# Patient Record
Sex: MALE | Race: WHITE | Employment: FULL TIME | URBAN - METROPOLITAN AREA
[De-identification: names, ages, dates, MRNs, and addresses within clinical notes are randomized per-mention and may not be internally consistent; named-entity substitution may affect disease eponyms.]

---

## 2017-03-23 ENCOUNTER — TELEPHONE (OUTPATIENT)
Dept: CARDIOLOGY | Facility: CLINIC | Age: 52
End: 2017-03-23

## 2017-03-24 ENCOUNTER — OFFICE VISIT (OUTPATIENT)
Dept: CARDIOLOGY | Facility: CLINIC | Age: 52
End: 2017-03-24
Attending: INTERNAL MEDICINE
Payer: COMMERCIAL

## 2017-03-24 VITALS
BODY MASS INDEX: 30.92 KG/M2 | WEIGHT: 216 LBS | SYSTOLIC BLOOD PRESSURE: 124 MMHG | HEIGHT: 70 IN | HEART RATE: 65 BPM | DIASTOLIC BLOOD PRESSURE: 92 MMHG | OXYGEN SATURATION: 98 %

## 2017-03-24 DIAGNOSIS — I10 ESSENTIAL HYPERTENSION: ICD-10-CM

## 2017-03-24 DIAGNOSIS — I25.10 CORONARY ARTERY DISEASE INVOLVING NATIVE CORONARY ARTERY OF NATIVE HEART WITHOUT ANGINA PECTORIS: ICD-10-CM

## 2017-03-24 PROCEDURE — 99214 OFFICE O/P EST MOD 30 MIN: CPT | Performed by: INTERNAL MEDICINE

## 2017-03-24 RX ORDER — ATORVASTATIN CALCIUM 10 MG/1
10 TABLET, FILM COATED ORAL DAILY
Qty: 30 TABLET | Refills: 11 | Status: SHIPPED | OUTPATIENT
Start: 2017-03-24 | End: 2018-01-15

## 2017-03-24 RX ORDER — ASPIRIN 325 MG
325 TABLET ORAL DAILY
COMMUNITY
Start: 2017-03-24 | End: 2019-01-16

## 2017-03-24 NOTE — LETTER
3/24/2017    Kaylee Villavicencio  [facility]   [address]  [place] [address]    RE: Steve Griggsin       Dear Colleague,    I had the pleasure of seeing Steve Corcoran in the [facility].    Steve Corcoran, a 51-year-old man with coronary artery disease, distant history of pulmonary embolus, factor V Leiden deficiency, hypertension, and dyslipidemia was seen today for followup of coronary disease.      The patient initially presented to our system in 02/2016 with prolonged left-sided chest tightness radiating to his back, accompanied by dyspnea.  A CT pulmonary angiogram showed no evidence of pulmonary embolus, but troponin levels were mildly positive.  Diagnostic left heart catheterization, left ventriculogram and coronary angiography was performed.  The ejection fraction was 55% with no regional wall motion abnormalities.  The coronaries had generalized mild atheromatous change, but no significant focal narrowing and VIELKA 3 flow in all vessels.      We recommended standard medical therapy including aspirin, clopidogrel, high-potency statin agent, metoprolol and lisinopril.      Since I last saw the patient, he has discontinued all of his medications on his own and without seeking our .     The patient has felt well and denies chest, arm, neck, jaw or back discomfort with exertion.  He continues to smoke cigarettes.      PHYSICAL EXAMINATION:   GENERAL:  Exam today demonstrates a very friendly, cooperative 51-year-old man.   VITAL SIGNS:  His blood pressure is 124/90.  His heart rate is 65.  His height is 1.8 meters, his weight is 98 kg.  His BMI is 31.   LUNGS:  Clear to percussion and auscultation.   CARDIOVASCULAR:  Shows a normal S1 with a normal S2, there is no S3.  There is no murmur, rub or click.     Outpatient Encounter Prescriptions as of 3/24/2017   Medication Sig Dispense Refill     atorvastatin (LIPITOR) 10 MG tablet Take 1 tablet (10 mg) by mouth  daily 30 tablet 11     aspirin 325 MG tablet Take 1 tablet (325 mg) by mouth daily       [DISCONTINUED] clopidogrel (PLAVIX) 75 MG tablet Take 1 tablet (75 mg) by mouth daily 30 tablet 12     [DISCONTINUED] oxyCODONE-acetaminophen (PERCOCET) 5-325 MG per tablet Take 1-2 tablets by mouth every 4 hours as needed for moderate to severe pain 30 tablet 0     [DISCONTINUED] lisinopril (PRINIVIL,ZESTRIL) 10 MG tablet Take 1 tablet (10 mg) by mouth daily 90 tablet 3     [DISCONTINUED] atorvastatin (LIPITOR) 40 MG tablet Take 1 tablet (40 mg) by mouth daily 90 tablet 3     [DISCONTINUED] aspirin 81 MG EC tablet Take 1 tablet (81 mg) by mouth daily  0     No facility-administered encounter medications on file as of 3/24/2017.       ASSESSMENT:  Mr. Corcoran is asymptomatic.  I have reviewed the importance of cessation of all tobacco use and compliance with aspirin and statin therapy for the reduction of future adverse cardiac events.      At this point, I have nothing to add to his current medical therapy.  The patient told me he has not seen his primary care doctors for quite a while but plans to resume followup with Dr. Villavicencio.      RECOMMENDATIONS:   1.  Continue aspirin, at least 81 mg daily.   2.  Continue atorvastatin 40 mg a day.  I rewrote another prescription for the patient.   3.  I have counseled the patient regarding a Mediterranean-style low-fat, low-cholesterol diet.   4.  Cessation of all tobacco use.      Followup visit hs not been scheduled at this time, but we would be happy to see him again at any point in the future should you so request.      Again, thank you for allowing me to participate in the care of your patient.      Sincerely,    Uriah Madera MD     [facility]

## 2017-03-24 NOTE — MR AVS SNAPSHOT
"              After Visit Summary   3/24/2017    Steve Corcoran    MRN: [id number]           Patient Information     Date Of Birth          1965        Visit Information        Provider Department      3/24/2017 3:45 PM Uriah Madera MD [facility]        Today's Diagnoses     Coronary artery disease involving native coronary artery of native heart without angina pectoris        Essential hypertension           Follow-ups after your visit        Who to contact     If you have questions or need follow up information about today's clinic visit or your schedule please contact [facility] directly at [phone].  Normal or non-critical lab and imaging results will be communicated to you by MyChart, letter or phone within 4 business days after the clinic has received the results. If you do not hear from us within 7 days, please contact the clinic through [employer] or phone. If you have a critical or abnormal lab result, we will notify you by phone as soon as possible.  Submit refill requests through [employer] or call your pharmacy and they will forward the refill request to us. Please allow 3 business days for your refill to be completed.          Additional Information About Your Visit        MyChart Information     [employer] lets you send messages to your doctor, view your test results, renew your prescriptions, schedule appointments and more. To sign up, go to [email or web address] . Click on \"Log in\" on the left side of the screen, which will take you to the Welcome page. Then click on \"Sign up Now\" on the right side of the page.     You will be asked to enter the access code listed below, as well as some personal information. Please follow the directions to create your username and password.     Your access code is: [email or web address]  Expires: 2017  4:26 PM     Your access code will  in 90 days. If you need help or a " "new code, please call your [facility] clinic or [phone].        Care EveryWhere ID     This is your Care EveryWhere ID. This could be used by other organizations to access your [facility] medical records  [id number]        Your Vitals Were     Pulse Height Pulse Oximetry BMI (Body Mass Index)          65 1.778 m (5' 10\") 98% 30.99 kg/m2         Blood Pressure from Last 3 Encounters:   03/24/17 (!) 124/92   05/02/16 120/80   04/21/16 105/62    Weight from Last 3 Encounters:   03/24/17 98 kg (216 lb)   05/02/16 95.7 kg (211 lb)   03/14/16 98.4 kg (217 lb)              We Performed the Following     Follow-Up with Cardiologist          Today's Medication Changes          These changes are accurate as of: 3/24/17  4:27 PM.  If you have any questions, ask your nurse or doctor.               Start taking these medicines.        Dose/Directions    aspirin 325 MG tablet   Used for:  Coronary artery disease involving native coronary artery of native heart without angina pectoris   Started by:  Uriah Madera MD        Dose:  325 mg   Take 1 tablet (325 mg) by mouth daily   Refills:  0       atorvastatin 10 MG tablet   Commonly known as:  LIPITOR   Used for:  Coronary artery disease involving native coronary artery of native heart without angina pectoris, Essential hypertension   Started by:  Uriah Madera MD        Dose:  10 mg   Take 1 tablet (10 mg) by mouth daily   Quantity:  30 tablet   Refills:  11            Where to get your medicines      These medications were sent to [id number] - [facility] [place] [address] [address]     Phone:  [phone]     atorvastatin 10 MG tablet         Some of these will need a paper prescription and others can be bought over the counter.  Ask your nurse if you have questions.     You don't need a prescription for these medications     aspirin 325 MG tablet                Primary Care Provider Office Phone # " Fax #    Kaylee Villavicencio [phone]       [facility] [address] [place] [address]        Thank you!     Thank you for choosing [facility]  for your care. Our goal is always to provide you with excellent care. Hearing back from our patients is one way we can continue to improve our services. Please take a few minutes to complete the written survey that you may receive in the mail after your visit with us. Thank you!             Your Updated Medication List - Protect others around you: Learn how to safely use, store and throw away your medicines at www.disposemymeds.org.          This list is accurate as of: 3/24/17  4:27 PM.  Always use your most recent med list.                   Brand Name Dispense Instructions for use    aspirin 325 MG tablet      Take 1 tablet (325 mg) by mouth daily       atorvastatin 10 MG tablet    LIPITOR    30 tablet    Take 1 tablet (10 mg) by mouth daily

## 2017-03-25 NOTE — PROGRESS NOTES
HISTORY OF PRESENT ILLNESS:  Steve Corcoran, a 51-year-old man with coronary artery disease, distant history of pulmonary embolus, factor V Leiden deficiency, hypertension, and dyslipidemia was seen today for followup of coronary disease.      The patient initially presented to our system in 02/2016 with prolonged left-sided chest tightness radiating to his back, accompanied by dyspnea.  A CT pulmonary angiogram showed no evidence of pulmonary embolus, but troponin levels were mildly positive.  Diagnostic left heart catheterization, left ventriculogram and coronary angiography was performed.  The ejection fraction was 55% with no regional wall motion abnormalities.  The coronaries had generalized mild atheromatous change, but no significant focal narrowing and VIELKA 3 flow in all vessels.      We recommended standard medical therapy including aspirin, clopidogrel, high-potency statin agent, metoprolol and lisinopril.      Since I last saw the patient, he has discontinued all of his medications on his own and without seeking our .     The patient has felt well and denies chest, arm, neck, jaw or back discomfort with exertion.  He continues to smoke cigarettes.      PHYSICAL EXAMINATION:   GENERAL:  Exam today demonstrates a very friendly, cooperative 51-year-old man.   VITAL SIGNS:  His blood pressure is 124/90.  His heart rate is 65.  His height is 1.8 meters, his weight is 98 kg.  His BMI is 31.   LUNGS:  Clear to percussion and auscultation.   CARDIOVASCULAR:  Shows a normal S1 with a normal S2, there is no S3.  There is no murmur, rub or click.      ASSESSMENT:  Mr. Corcoran is asymptomatic.  I have reviewed the importance of cessation of all tobacco use and compliance with aspirin and statin therapy for the reduction of future adverse cardiac events.      At this point, I have nothing to add to his current medical therapy.  The patient told me he has not seen his primary care doctors for quite a while but  plans to resume followup with Dr. Villavicencio.      RECOMMENDATIONS:   1.  Continue aspirin, at least 81 mg daily.   2.  Continue atorvastatin 40 mg a day.  I rewrote another prescription for the patient.   3.  I have counseled the patient regarding a Mediterranean-style low-fat, low-cholesterol diet.   4.  Cessation of all tobacco use.      Followup visit hs not been scheduled at this time, but we would be happy to see him again at any point in the future should you so request.      cc:   [place] [address]         TITA FARIAS MD             D: 2017 16:32   T: 2017 06:54   MT: FEDERICO      Name:     RAMESH RINCON   MRN:      [id number]        Account:      [id number]   :      1965           Service Date: 2017      Document: [id number]

## 2018-01-15 ENCOUNTER — HOSPITAL ENCOUNTER (EMERGENCY)
Facility: CLINIC | Age: 53
Discharge: HOME | End: 2018-01-15
Attending: NURSE PRACTITIONER | Admitting: NURSE PRACTITIONER
Payer: OTHER MISCELLANEOUS

## 2018-01-15 VITALS
SYSTOLIC BLOOD PRESSURE: 150 MMHG | HEART RATE: 78 BPM | OXYGEN SATURATION: 100 % | DIASTOLIC BLOOD PRESSURE: 102 MMHG | TEMPERATURE: 97.6 F | RESPIRATION RATE: 20 BRPM

## 2018-01-15 DIAGNOSIS — R03.0 ELEVATED BLOOD PRESSURE READING: ICD-10-CM

## 2018-01-15 DIAGNOSIS — S61.011A LACERATION OF RIGHT THUMB WITHOUT FOREIGN BODY WITHOUT DAMAGE TO NAIL, INITIAL ENCOUNTER: ICD-10-CM

## 2018-01-15 PROCEDURE — 12002 RPR S/N/AX/GEN/TRNK2.6-7.5CM: CPT

## 2018-01-15 PROCEDURE — 99283 EMERGENCY DEPT VISIT LOW MDM: CPT

## 2018-01-15 RX ORDER — LIDOCAINE HYDROCHLORIDE 10 MG/ML
INJECTION, SOLUTION INFILTRATION; PERINEURAL
Status: DISCONTINUED
Start: 2018-01-15 | End: 2018-01-15 | Stop reason: HOSPADM

## 2018-01-15 RX ORDER — BUPIVACAINE HYDROCHLORIDE 5 MG/ML
INJECTION, SOLUTION PERINEURAL
Status: DISCONTINUED
Start: 2018-01-15 | End: 2018-01-15 | Stop reason: HOSPADM

## 2018-01-15 RX ORDER — LIDOCAINE HYDROCHLORIDE 10 MG/ML
INJECTION, SOLUTION EPIDURAL; INFILTRATION; INTRACAUDAL; PERINEURAL
Status: DISCONTINUED
Start: 2018-01-15 | End: 2018-01-15 | Stop reason: WASHOUT

## 2018-01-15 ASSESSMENT — ENCOUNTER SYMPTOMS: WOUND: 1

## 2018-01-15 NOTE — LETTER
January 15, 2018      To Whom It May Concern:      Steve Corcoran was seen in our Emergency Department today, 01/15/18. He may return tomorrow. Wound needs to be kept clean and dry.        Sincerely,        Macrina Sahu RN

## 2018-01-15 NOTE — ED AVS SNAPSHOT
[facility] Emergency Department    [address]    [facility] [address]    Phone:  [phone]    Fax:  [phone]                                       Steve Corcoran   MRN: [id number]    Department:  [facility] Emergency Department   Date of Visit:  1/15/2018           After Visit Summary Signature Page     I have received my discharge instructions, and my questions have been answered. I have discussed any challenges I see with this plan with the nurse or doctor.    ..........................................................................................................................................  Patient/Patient Representative Signature      ..........................................................................................................................................  Patient Representative Print Name and Relationship to Patient    ..................................................               ................................................  Date                                            Time    ..........................................................................................................................................  Reviewed by Signature/Title    ...................................................              ..............................................  Date                                                            Time

## 2018-01-15 NOTE — ED AVS SNAPSHOT
[facility] Emergency Department    [address] [place] [address]    Phone:  [phone]    Fax:  [phone]                                       Steve Corcoran   MRN: [id number]    Department:  [facility] Emergency Department   Date of Visit:  1/15/2018           Patient Information     Date Of Birth          1965        Your diagnoses for this visit were:     Laceration of right thumb without foreign body without damage to nail, initial encounter     Elevated blood pressure reading        You were seen by Bart Glaser, [facility].      Follow-up Information     Follow up with Kaylee Villavicencio In 3 days.    Specialty:  Family Practice    Contact information:    [facility]  [address] [place] [address] [phone]          Discharge Instructions          Have sutures out in 10 days.    * LACERATION (All Closures)  A laceration is a cut through the skin. This will usually require stitches (sutures) or staples if it is deep. Minor cuts may be treated with a tape closure ( Steri-Strips ) or Dermabond skin glue.       HOME CARE:  PAIN MEDICINE: You may use acetaminophen (Tylenol) 650-1000 mg every 6 hours or ibuprofen (Motrin, Advil) 600 mg every 6-8 hours with food to control pain, if you are able to take these medicines. [NOTE: If you have chronic liver or kidney disease or ever had a stomach ulcer or GI bleeding, talk with your doctor before using these medicines.]  EXTREMITY, FACE or TRUNK WOUNDS:    Keep the wound clean and dry. If a bandage was applied and it becomes wet or dirty, replace it. Otherwise, leave it in place for the first 24 hours.    If stitches or staples were used, clean the wound daily. Protect the wound from sunlight and tanning lamps.    After removing the bandage, wash the area with soap and water. Use a wet cotton swab (Q tip) to loosen and remove any blood or crust that forms.    After cleaning, apply a thin  layer of Polysporin or Bacitracin ointment. This will keep the wound clean and make it easier to remove the stitches or staples. Reapply a fresh bandage.    You may remove the bandage to shower as usual after the first 24 hours, but do not soak the area in water (no swimming) until the stitches or staples are removed.    If Steri-Strips were used, keep the area clean and dry. If it becomes wet, blot it dry with a towel. It is okay to take a brief shower, but avoid scrubbing the area.    If Dermabond skin adhesive was used, do not scratch, rub or pick at the adhesive film. Do not place tape directly over the film. Do not apply liquid, ointment or creams to the wound while the film is in place. Do not clean the wound with peroxide and do not apply ointments. Avoid activities that cause heavy sweating until the film has fallen off. Protect the wound from prolonged exposure to sunlight or tanning lamps. You may shower as usual but do not soak the wound in water (no baths or swimming). The film will fall off by itself in 5-10 days.  SCALP WOUNDS: During the first two days, you may carefully rinse your hair in the shower to remove blood, glass or dirt particles. After two days, you may shower and shampoo your hair normally. Do not soak your scalp in the tub or go swimming until the stitches or staples have been removed.  MOUTH WOUNDS: Eat soft foods to reduce pain. If the cut is inside of your mouth, clean by rinsing after each meal and at bedtime with a mixture of equal parts water and Hydrogen Peroxide (do not swallow!). Or, you can use a cotton swab to directly apply Hydrogen Peroxide onto the cut.  After the wound is done healing, use sunscreen over the area whenever exposed for the next 6 minths to avoid a darker scar.     FOLLOW UP: Most skin wounds heal within ten days. Mouth and facial wounds heal within five days. However, even with proper treatment, a wound infection may sometimes occur. Therefore, you should  check the wound daily for signs of infection listed below.  Stitches should be removed from the face within five days; stitches and staples should be removed from other parts of the body within 7-10 days. Unless you are told to come back to the emergency room, you may have your doctor or urgent care remove the stitches. If dissolving stitches were used in the mouth, these will fall out or dissolve without the need for removal. If tape closures ( Steri-Strips ) were used, remove them yourself if they have not fallen off after 7 days. If Dermabond skin glue was used, the film will fall off by itself in 5-10 days.      GET PROMPT MEDICAL ATTENTION  if any of the following occur:    Increasing pain in the wound    Redness, swelling or pus coming from the wound    Fever over 101 F (38.3 C) oral    If stitches or staples come apart or fall out or if Steri-Strips fall off before seven days    If the wound edges re-open    Bleeding not controlled by direct pressure    6126-1486 The "[employer]". [address] [place] [address]. All rights reserved. This information is not intended as a substitute for professional medical care. Always follow your healthcare professional's instructions.  This information has been modified by your health care provider with permission from the publisher.      24 Hour Appointment Hotline       To make an appointment at any [facility], call [phone] ([phone]). If you don't have a family doctor or clinic, we will help you find one. [facility] clinics are conveniently located to serve the needs of you and your family.             Review of your medicines      Our records show that you are taking the medicines listed below. If these are incorrect, please call your family doctor or clinic.        Dose / Directions Last dose taken    aspirin 325 MG tablet   Dose:  325 mg        Take 1 tablet (325 mg) by mouth daily   Refills:  0        CHANTIX PO        Refills:  0         CRESTOR PO        Refills:  0                Orders Needing Specimen Collection     None      Pending Results     No orders found from 1/13/2018 to 1/16/2018.            Pending Culture Results     No orders found from 1/13/2018 to 1/16/2018.            Pending Results Instructions     If you had any lab results that were not finalized at the time of your Discharge, you can call the ED Lab Result RN at [phone]. You will be contacted by this team for any positive Lab results or changes in treatment. The nurses are available 7 days a week from 10A to 6:30P.  You can leave a message 24 hours per day and they will return your call.        Test Results From Your Hospital Stay               Clinical Quality Measure: Blood Pressure Screening     Your blood pressure was checked while you were in the emergency department today. The last reading we obtained was  BP: (!) 150/102 . Please read the guidelines below about what these numbers mean and what you should do about them.  If your systolic blood pressure (the top number) is less than 120 and your diastolic blood pressure (the bottom number) is less than 80, then your blood pressure is normal. There is nothing more that you need to do about it.  If your systolic blood pressure (the top number) is 120-139 or your diastolic blood pressure (the bottom number) is 80-89, your blood pressure may be higher than it should be. You should have your blood pressure rechecked within a year by a primary care provider.  If your systolic blood pressure (the top number) is 140 or greater or your diastolic blood pressure (the bottom number) is 90 or greater, you may have high blood pressure. High blood pressure is treatable, but if left untreated over time it can put you at risk for heart attack, stroke, or kidney failure. You should have your blood pressure rechecked by a primary care provider within the next 4 weeks.  If your provider in the emergency department today gave you  "specific instructions to follow-up with your doctor or provider even sooner than that, you should follow that instruction and not wait for up to 4 weeks for your follow-up visit.        Thank you for choosing [facility]       Thank you for choosing [facility] for your care. Our goal is always to provide you with excellent care. Hearing back from our patients is one way we can continue to improve our services. Please take a few minutes to complete the written survey that you may receive in the mail after you visit with us. Thank you!        [employer] Information     [employer] lets you send messages to your doctor, view your test results, renew your prescriptions, schedule appointments and more. To sign up, go to [email or web address] . Click on \"Log in\" on the left side of the screen, which will take you to the Welcome page. Then click on \"Sign up Now\" on the right side of the page.     You will be asked to enter the access code listed below, as well as some personal information. Please follow the directions to create your username and password.     Your access code is: [email or web address]  Expires: 4/15/2018  7:16 PM     Your access code will  in 90 days. If you need help or a new code, please call your [facility] clinic or [phone].        Care EveryWhere ID     This is your Care EveryWhere ID. This could be used by other organizations to access your [facility] medical records  [id number]        Equal Access to Services     GINA MCHUGH : Hadii almas John, christy peterson, qasonal angalbraden mars . So [facility] [phone].    ATENCIÓN: Si habla español, tiene a banuelos disposición servicios gratuitos de asistencia lingüística. Madeleine al [phone].    We comply with applicable federal civil rights laws and [place] laws. We do not discriminate on the basis of race, color, national origin, age, disability, sex, sexual orientation, or gender identity.            After " Visit Summary       This is your record. Keep this with you and show to your community pharmacist(s) and doctor(s) at your next visit.

## 2018-01-16 NOTE — ED NOTES
Right thumb laceration at work about 1700 this evening. Cut on the edge of an oil pan.   Bleeding controlled tetanus up to date.  Patient alert and oriented x3.  Airway, breathing and circulation intact.

## 2018-01-16 NOTE — DISCHARGE INSTRUCTIONS
Have sutures out in 10 days.    * LACERATION (All Closures)  A laceration is a cut through the skin. This will usually require stitches (sutures) or staples if it is deep. Minor cuts may be treated with a tape closure ( Steri-Strips ) or Dermabond skin glue.       HOME CARE:  PAIN MEDICINE: You may use acetaminophen (Tylenol) 650-1000 mg every 6 hours or ibuprofen (Motrin, Advil) 600 mg every 6-8 hours with food to control pain, if you are able to take these medicines. [NOTE: If you have chronic liver or kidney disease or ever had a stomach ulcer or GI bleeding, talk with your doctor before using these medicines.]  EXTREMITY, FACE or TRUNK WOUNDS:    Keep the wound clean and dry. If a bandage was applied and it becomes wet or dirty, replace it. Otherwise, leave it in place for the first 24 hours.    If stitches or staples were used, clean the wound daily. Protect the wound from sunlight and tanning lamps.    After removing the bandage, wash the area with soap and water. Use a wet cotton swab (Q tip) to loosen and remove any blood or crust that forms.    After cleaning, apply a thin layer of Polysporin or Bacitracin ointment. This will keep the wound clean and make it easier to remove the stitches or staples. Reapply a fresh bandage.    You may remove the bandage to shower as usual after the first 24 hours, but do not soak the area in water (no swimming) until the stitches or staples are removed.    If Steri-Strips were used, keep the area clean and dry. If it becomes wet, blot it dry with a towel. It is okay to take a brief shower, but avoid scrubbing the area.    If Dermabond skin adhesive was used, do not scratch, rub or pick at the adhesive film. Do not place tape directly over the film. Do not apply liquid, ointment or creams to the wound while the film is in place. Do not clean the wound with peroxide and do not apply ointments. Avoid activities that cause heavy sweating until the film has fallen off.  Protect the wound from prolonged exposure to sunlight or tanning lamps. You may shower as usual but do not soak the wound in water (no baths or swimming). The film will fall off by itself in 5-10 days.  SCALP WOUNDS: During the first two days, you may carefully rinse your hair in the shower to remove blood, glass or dirt particles. After two days, you may shower and shampoo your hair normally. Do not soak your scalp in the tub or go swimming until the stitches or staples have been removed.  MOUTH WOUNDS: Eat soft foods to reduce pain. If the cut is inside of your mouth, clean by rinsing after each meal and at bedtime with a mixture of equal parts water and Hydrogen Peroxide (do not swallow!). Or, you can use a cotton swab to directly apply Hydrogen Peroxide onto the cut.  After the wound is done healing, use sunscreen over the area whenever exposed for the next 6 minths to avoid a darker scar.     FOLLOW UP: Most skin wounds heal within ten days. Mouth and facial wounds heal within five days. However, even with proper treatment, a wound infection may sometimes occur. Therefore, you should check the wound daily for signs of infection listed below.  Stitches should be removed from the face within five days; stitches and staples should be removed from other parts of the body within 7-10 days. Unless you are told to come back to the emergency room, you may have your doctor or urgent care remove the stitches. If dissolving stitches were used in the mouth, these will fall out or dissolve without the need for removal. If tape closures ( Steri-Strips ) were used, remove them yourself if they have not fallen off after 7 days. If Dermabond skin glue was used, the film will fall off by itself in 5-10 days.      GET PROMPT MEDICAL ATTENTION  if any of the following occur:    Increasing pain in the wound    Redness, swelling or pus coming from the wound    Fever over 101 F (38.3 C) oral    If stitches or staples come apart or  fall out or if Steri-Strips fall off before seven days    If the wound edges re-open    Bleeding not controlled by direct pressure    3928-3845 [employer]. [address] [place] [address], [place] [address]. All rights reserved. This information is not intended as a substitute for professional medical care. Always follow your healthcare professional's instructions.  This information has been modified by your health care provider with permission from the publisher.

## 2018-01-16 NOTE — ED PROVIDER NOTES
History     Chief Complaint:  Laceration    HPI   Steve Corcoran is a left handed 52 year old male who presents to the emergency department today for evaluation of a right thumb laceration. At 1700 this evening the patient was removing the oil pan from a truck when he dropped it and cut his right thumb. The patient is up to date on tetanus and immunizations    Allergies:  No Known Drug Allergies    Medications:    Lipitor  Chantix   Aspirin    Past Medical History:    CAD (coronary artery disease)   Coagulation disorder    Dyslipidemia   Hypertension   NSTEMI (non-ST elevated myocardial infarction) (  Pulmonary embolus    Thrombosis of leg   Tobacco abuse     Past Surgical History:    Arthroscopy - knee  Coronary angiography adult order  Surgery - Right and left foot  Surgery - Thumb reattachment    Family History:    History reviewed. No pertinent family history.     Social History:  The patient was accompanied to the ED by himself.  Smoking Status: Current Everyday Smoker   Alcohol Use: Positive  Marital Status:       Review of Systems   Skin: Positive for wound (right thumb laceration).   All other systems reviewed and are negative.    Physical Exam     Patient Vitals for the past 24 hrs:   BP Temp Temp src Pulse Resp SpO2   01/15/18 1813 (!) 150/102 97.6  F (36.4  C) Oral 78 20 100 %     Physical Exam  Eyes: Pupils equally round  HENT: Head is normal in appearance. Oropharynx is normal with moist mucus membranes.  Cardiovascular: Normal color of mucus membranes  Respiratory: Normal respiratory effort  Musculoskeletal: No asymmetry  Skin: Normal, without rash. Right thumb laceration. No exposed tendon. Normal thumb function.  Normal flexure extension. Normal sensation. Size 3 cm laceration.  Lymphatic: No edema  Neurologic: Cranial nerves grossly intact, normal cognition, no apparent deficits.  Psychiatric: Normal affect.  Emergency Department Course     Procedures:   Laceration Repair Procedure Note:     Performed by: Bart Glaser APRN C*  Authorized by: Bart Glaser APRN C*  Consent given by: Patient who states understanding of the procedure being performed after discussing the risks, benefits and alternatives.    Preparation: Patient was prepped and draped in usual sterile fashion.  Irrigation solution: saline    Body area: Right Thumb  Laceration length: 3 cm  Contamination: The wound is contaminated.  Foreign bodies: none  Tendon involvement: none  Anesthesia: Local  Local anesthetic: Lidocaine 2%, with no epinephrine  Anesthetic total: 5ml    Debridement: none  Skin closure: Closed with 2 x 4.0 Ethilon  Technique: interrupted  Approximation: close  Approximation difficulty: simple    Sutures out in 10 days    Patient tolerance: Patient tolerated the procedure well with no immediate complications.    Interventions:  1828 1% lidocaine 5 mL Injection    Emergency Department Course:    1815 Nursing notes and vitals reviewed.    1820 I performed an exam of the patient as documented above.     1828 Wound numbed with Lidocaine 1%.    1830 Wound cleaned.    1840 Wound sutured.     1900 I personally reviewed the results with the patient and answered all related questions prior to discharge.    Impression & Plan      Medical Decision Making:  The patient presented with a laceration. The wound was carefully evaluated and explored. The laceration was closed with sutures as noted above. There is no evidence of muscular, tendon, or bony damage with this laceration. No exposed tendon, no evidence of tendon impairment, has normal thumb function and sensation. No signs of foreign body. Possible complications (infection, scarring) were reviewed with the patient. Follow up with primary care will be indicated for suture removal as noted in the discharge section.    Patient states he is aware of his elevated blood pressure he is currently working closely with his doctor and frequent and regular check up. Patient  understands the complications of untreated HTN like MI, stroke, organ damage etc.      Diagnosis:    ICD-10-CM    1. Laceration of right thumb without foreign body without damage to nail, initial encounter S61.011A    2. Elevated blood pressure reading R03.0      Disposition:   Discharge    Scribe Disclosure:  I, oRbert Dalton, am serving as a scribe at 6:12 PM on 1/15/2018 to document services personally performed by Bart Glaser, LAVERN C* based on my observations and the provider's statements to me.      [facility] EMERGENCY DEPARTMENT       Bart Glaser, LAVERN CNP  01/15/18 1959

## 2019-01-07 ENCOUNTER — HOSPITAL ENCOUNTER (EMERGENCY)
Facility: CLINIC | Age: 54
Discharge: HOME | End: 2019-01-08
Attending: EMERGENCY MEDICINE | Admitting: EMERGENCY MEDICINE
Payer: COMMERCIAL

## 2019-01-07 DIAGNOSIS — I26.99 OTHER ACUTE PULMONARY EMBOLISM WITHOUT ACUTE COR PULMONALE (H): ICD-10-CM

## 2019-01-07 LAB
ALBUMIN SERPL-MCNC: 3.9 G/DL (ref 3.4–5)
ALP SERPL-CCNC: 86 U/L (ref 40–150)
ALT SERPL W P-5'-P-CCNC: 20 U/L (ref 0–70)
ANION GAP SERPL CALCULATED.3IONS-SCNC: 16 MMOL/L (ref 6–17)
ANION GAP SERPL CALCULATED.3IONS-SCNC: 8 MMOL/L (ref 3–14)
AST SERPL W P-5'-P-CCNC: 11 U/L (ref 0–45)
BASOPHILS # BLD AUTO: 0 10E9/L (ref 0–0.2)
BASOPHILS NFR BLD AUTO: 0.3 %
BILIRUB SERPL-MCNC: 1.1 MG/DL (ref 0.2–1.3)
BUN SERPL-MCNC: 7 MG/DL (ref 7–30)
BUN SERPL-MCNC: 8 MG/DL (ref 7–30)
CA-I BLD-SCNC: 4.7 MG/DL (ref 4.4–5.2)
CALCIUM SERPL-MCNC: 8.9 MG/DL (ref 8.5–10.1)
CHLORIDE BLD-SCNC: 100 MMOL/L (ref 94–109)
CHLORIDE SERPL-SCNC: 104 MMOL/L (ref 94–109)
CO2 BLD-SCNC: 22 MMOL/L (ref 20–32)
CO2 BLDCOV-SCNC: 22 MMOL/L (ref 21–28)
CO2 SERPL-SCNC: 24 MMOL/L (ref 20–32)
CREAT BLD-MCNC: 1 MG/DL (ref 0.66–1.25)
CREAT SERPL-MCNC: 1.02 MG/DL (ref 0.66–1.25)
D DIMER PPP FEU-MCNC: 1.7 UG/ML FEU (ref 0–0.5)
DIFFERENTIAL METHOD BLD: ABNORMAL
EOSINOPHIL # BLD AUTO: 0.2 10E9/L (ref 0–0.7)
EOSINOPHIL NFR BLD AUTO: 1.6 %
ERYTHROCYTE [DISTWIDTH] IN BLOOD BY AUTOMATED COUNT: 12.2 % (ref 10–15)
GFR SERPL CREATININE-BSD FRML MDRD: 78 ML/MIN/{1.73_M2}
GFR SERPL CREATININE-BSD FRML MDRD: 84 ML/MIN/{1.73_M2}
GLUCOSE BLD-MCNC: 138 MG/DL (ref 70–99)
GLUCOSE SERPL-MCNC: 136 MG/DL (ref 70–99)
HCT VFR BLD AUTO: 45.6 % (ref 40–53)
HCT VFR BLD CALC: 46 %PCV (ref 40–53)
HGB BLD CALC-MCNC: 15.6 G/DL (ref 13.3–17.7)
HGB BLD-MCNC: 15.8 G/DL (ref 13.3–17.7)
IMM GRANULOCYTES # BLD: 0.1 10E9/L (ref 0–0.4)
IMM GRANULOCYTES NFR BLD: 0.5 %
LACTATE BLD-SCNC: 1.8 MMOL/L (ref 0.7–2.1)
LIPASE SERPL-CCNC: 79 U/L (ref 73–393)
LYMPHOCYTES # BLD AUTO: 2.4 10E9/L (ref 0.8–5.3)
LYMPHOCYTES NFR BLD AUTO: 20 %
MCH RBC QN AUTO: 31.2 PG (ref 26.5–33)
MCHC RBC AUTO-ENTMCNC: 34.6 G/DL (ref 31.5–36.5)
MCV RBC AUTO: 90 FL (ref 78–100)
MONOCYTES # BLD AUTO: 0.9 10E9/L (ref 0–1.3)
MONOCYTES NFR BLD AUTO: 7.7 %
NEUTROPHILS # BLD AUTO: 8.5 10E9/L (ref 1.6–8.3)
NEUTROPHILS NFR BLD AUTO: 69.9 %
NRBC # BLD AUTO: 0 10*3/UL
NRBC BLD AUTO-RTO: 0 /100
PCO2 BLDV: 33 MM HG (ref 40–50)
PH BLDV: 7.43 PH (ref 7.32–7.43)
PLATELET # BLD AUTO: 238 10E9/L (ref 150–450)
PO2 BLDV: 51 MM HG (ref 25–47)
POTASSIUM BLD-SCNC: 3.6 MMOL/L (ref 3.4–5.3)
POTASSIUM SERPL-SCNC: 3.7 MMOL/L (ref 3.4–5.3)
PROT SERPL-MCNC: 7.9 G/DL (ref 6.8–8.8)
RBC # BLD AUTO: 5.06 10E12/L (ref 4.4–5.9)
SAO2 % BLDV FROM PO2: 87 %
SODIUM BLD-SCNC: 138 MMOL/L (ref 133–144)
SODIUM SERPL-SCNC: 136 MMOL/L (ref 133–144)
TROPONIN I SERPL-MCNC: <0.015 UG/L (ref 0–0.04)
WBC # BLD AUTO: 12.2 10E9/L (ref 4–11)

## 2019-01-07 PROCEDURE — 82803 BLOOD GASES ANY COMBINATION: CPT

## 2019-01-07 PROCEDURE — 85379 FIBRIN DEGRADATION QUANT: CPT | Performed by: EMERGENCY MEDICINE

## 2019-01-07 PROCEDURE — 25000125 ZZHC RX 250: Performed by: EMERGENCY MEDICINE

## 2019-01-07 PROCEDURE — 84484 ASSAY OF TROPONIN QUANT: CPT | Performed by: EMERGENCY MEDICINE

## 2019-01-07 PROCEDURE — 80047 BASIC METABLC PNL IONIZED CA: CPT

## 2019-01-07 PROCEDURE — 99285 EMERGENCY DEPT VISIT HI MDM: CPT | Mod: 25

## 2019-01-07 PROCEDURE — 85014 HEMATOCRIT: CPT

## 2019-01-07 PROCEDURE — 94640 AIRWAY INHALATION TREATMENT: CPT

## 2019-01-07 PROCEDURE — 93005 ELECTROCARDIOGRAM TRACING: CPT

## 2019-01-07 PROCEDURE — 83690 ASSAY OF LIPASE: CPT | Performed by: EMERGENCY MEDICINE

## 2019-01-07 PROCEDURE — 80053 COMPREHEN METABOLIC PANEL: CPT | Performed by: EMERGENCY MEDICINE

## 2019-01-07 PROCEDURE — 83605 ASSAY OF LACTIC ACID: CPT

## 2019-01-07 PROCEDURE — 85025 COMPLETE CBC W/AUTO DIFF WBC: CPT | Performed by: EMERGENCY MEDICINE

## 2019-01-07 RX ORDER — LIDOCAINE 40 MG/G
CREAM TOPICAL
Status: DISCONTINUED | OUTPATIENT
Start: 2019-01-07 | End: 2019-01-08 | Stop reason: HOSPADM

## 2019-01-07 RX ORDER — IPRATROPIUM BROMIDE AND ALBUTEROL SULFATE 2.5; .5 MG/3ML; MG/3ML
3 SOLUTION RESPIRATORY (INHALATION) ONCE
Status: COMPLETED | OUTPATIENT
Start: 2019-01-07 | End: 2019-01-07

## 2019-01-07 RX ADMIN — IPRATROPIUM BROMIDE AND ALBUTEROL SULFATE 3 ML: .5; 3 SOLUTION RESPIRATORY (INHALATION) at 22:57

## 2019-01-07 ASSESSMENT — ENCOUNTER SYMPTOMS
APPETITE CHANGE: 0
FEVER: 1
COUGH: 1
DYSURIA: 0
HEMATURIA: 0
ABDOMINAL PAIN: 1
SHORTNESS OF BREATH: 1

## 2019-01-07 ASSESSMENT — MIFFLIN-ST. JEOR: SCORE: 1808.8

## 2019-01-07 NOTE — ED AVS SNAPSHOT
[facility] Emergency Department  [address]  [facility] [address]  Phone:  [phone]  Fax:  [phone]                                    Steve Corcoran   MRN: [id number]    Department:  [facility] Emergency Department   Date of Visit:  1/7/2019           After Visit Summary Signature Page    I have received my discharge instructions, and my questions have been answered. I have discussed any challenges I see with this plan with the nurse or doctor.    ..........................................................................................................................................  Patient/Patient Representative Signature      ..........................................................................................................................................  Patient Representative Print Name and Relationship to Patient    ..................................................               ................................................  Date                                   Time    ..........................................................................................................................................  Reviewed by Signature/Title    ...................................................              ..............................................  Date                                               Time          22EPIC Rev 08/18

## 2019-01-08 ENCOUNTER — APPOINTMENT (OUTPATIENT)
Dept: CT IMAGING | Facility: CLINIC | Age: 54
End: 2019-01-08
Payer: COMMERCIAL

## 2019-01-08 VITALS
HEART RATE: 67 BPM | BODY MASS INDEX: 30.06 KG/M2 | TEMPERATURE: 98.2 F | DIASTOLIC BLOOD PRESSURE: 84 MMHG | RESPIRATION RATE: 16 BRPM | WEIGHT: 210 LBS | OXYGEN SATURATION: 95 % | HEIGHT: 70 IN | SYSTOLIC BLOOD PRESSURE: 124 MMHG

## 2019-01-08 LAB
ALBUMIN UR-MCNC: 30 MG/DL
APPEARANCE UR: CLEAR
BACTERIA #/AREA URNS HPF: ABNORMAL /HPF
BILIRUB UR QL STRIP: NEGATIVE
COLOR UR AUTO: YELLOW
GLUCOSE UR STRIP-MCNC: NEGATIVE MG/DL
HGB UR QL STRIP: NEGATIVE
HYALINE CASTS #/AREA URNS LPF: 1 /LPF (ref 0–2)
INTERPRETATION ECG - MUSE: NORMAL
KETONES UR STRIP-MCNC: NEGATIVE MG/DL
LEUKOCYTE ESTERASE UR QL STRIP: NEGATIVE
MUCOUS THREADS #/AREA URNS LPF: PRESENT /LPF
NITRATE UR QL: NEGATIVE
PH UR STRIP: 5 PH (ref 5–7)
RADIOLOGIST FLAGS: ABNORMAL
RBC #/AREA URNS AUTO: 0 /HPF (ref 0–2)
SOURCE: ABNORMAL
SP GR UR STRIP: 1.02 (ref 1–1.03)
UROBILINOGEN UR STRIP-MCNC: 0 MG/DL (ref 0–2)
WBC #/AREA URNS AUTO: <1 /HPF (ref 0–5)

## 2019-01-08 PROCEDURE — 71260 CT THORAX DX C+: CPT

## 2019-01-08 PROCEDURE — 25000132 ZZH RX MED GY IP 250 OP 250 PS 637: Performed by: EMERGENCY MEDICINE

## 2019-01-08 PROCEDURE — 25000128 H RX IP 250 OP 636: Performed by: EMERGENCY MEDICINE

## 2019-01-08 PROCEDURE — 81001 URINALYSIS AUTO W/SCOPE: CPT | Performed by: EMERGENCY MEDICINE

## 2019-01-08 RX ORDER — OXYCODONE HYDROCHLORIDE 5 MG/1
5 TABLET ORAL EVERY 6 HOURS PRN
Qty: 16 TABLET | Refills: 0 | Status: SHIPPED | OUTPATIENT
Start: 2019-01-08 | End: 2019-02-01

## 2019-01-08 RX ORDER — ACETAMINOPHEN 500 MG
500-1000 TABLET ORAL EVERY 8 HOURS PRN
Qty: 1 TABLET | Refills: 0 | Status: SHIPPED | OUTPATIENT
Start: 2019-01-08 | End: 2019-02-01

## 2019-01-08 RX ORDER — IOPAMIDOL 755 MG/ML
500 INJECTION, SOLUTION INTRAVASCULAR ONCE
Status: COMPLETED | OUTPATIENT
Start: 2019-01-08 | End: 2019-01-08

## 2019-01-08 RX ORDER — ACETAMINOPHEN 500 MG
1000 TABLET ORAL ONCE
Status: COMPLETED | OUTPATIENT
Start: 2019-01-08 | End: 2019-01-08

## 2019-01-08 RX ADMIN — RIVAROXABAN 15 MG: 15 TABLET, FILM COATED ORAL at 02:35

## 2019-01-08 RX ADMIN — ACETAMINOPHEN 1000 MG: 500 TABLET, FILM COATED ORAL at 02:35

## 2019-01-08 RX ADMIN — SODIUM CHLORIDE 88 ML: 9 INJECTION, SOLUTION INTRAVENOUS at 00:28

## 2019-01-08 RX ADMIN — IOPAMIDOL 75 ML: 755 INJECTION, SOLUTION INTRAVENOUS at 00:28

## 2019-01-08 NOTE — ED TRIAGE NOTES
Right sided chest pain started yesterday and worsening. Tight breathing. Coughing and moving makes pain worst. History of PE, feels the same. ABCs intact.

## 2019-01-08 NOTE — ED PROVIDER NOTES
History     Chief Complaint:  Chest Pain, Shortness of Breath    The history is provided by the patient.      Steve Corcoran is a 53 year old male with a history of CAD, PE, NSTEMI, hyperlipidemia, hypertension, ACS and Factor V Leiden who presents to the emergency department for evaluation of chest pain and shortness of breath. Of note, the patient had a balloon expanding stent placed in his left external iliac artery secondary to stenosis. He was subsequently discharged on 3 months of 75 mg daily Plavix and daily 325 mg aspirin. He is no longer on Plavix, but is on aspirin. Since yesterday afternoon the patient complains of right lower quadrant abdominal pain radiating into his chest with accompanying shortness of breath and dry cough, worse with deep inspiration and movement. The persistence of the patient's symptoms since yesterday prompted the patient to seek further evaluation here in the ED, given his history of cardiac problems. Here, the patient complains of the right lower abdominal pain with accompanying chest pain, shortness of breath and cough. He does state that he has felt warm to the touch since onset of symptoms, but has not measured his temperature. He denies any co-occurrence of urinary symptoms including dysuria or hematuria, as well as denying any leg pain or swelling, or change in appetite. He additionally states that he has not smoked tobacco or drank alcohol since yesterday prior to onset of symptoms.     Allergies:  NKDA    Medications:    Aspirin  Varenicline  Rosuvastatin    Past Medical History:    CAD  Factor V Leiden  Hyperlipidemia  NSTEMI  PE  Hypertension  Peripheral Artery Disease  ACS    Past Surgical History:    Knee Arthroscopy  Coronary Angiography  IR Femoral Angiogram  Bilateral Foot Surgery  Thumb Reattachment    Family History:    Unknown, Adopted    Social History:  Marital Status:   [2]  Tobacco Use: Yes, 0.75 packs/day  Alcohol Use: Yes, socially    Review of  "Systems   Constitutional: Positive for fever (Subjective). Negative for appetite change.   Respiratory: Positive for cough and shortness of breath.    Cardiovascular: Positive for chest pain. Negative for leg swelling.   Gastrointestinal: Positive for abdominal pain.   Genitourinary: Negative for dysuria and hematuria.         Physical Exam     Patient Vitals for the past 24 hrs:   BP Temp Temp src Pulse Heart Rate Resp SpO2 Height Weight   01/08/19 0215 -- -- -- -- 70 -- 95 % -- --   01/08/19 0200 (!) 131/93 -- -- 66 63 -- 96 % -- --   01/08/19 0145 -- -- -- -- 67 -- 92 % -- --   01/08/19 0130 (!) 131/99 -- -- 67 70 -- 92 % -- --   01/08/19 0115 -- -- -- -- 69 -- 93 % -- --   01/08/19 0100 (!) 126/91 -- -- 64 66 -- 93 % -- --   01/08/19 0045 -- -- -- -- 66 -- 95 % -- --   01/08/19 0015 120/79 -- -- 65 66 -- 93 % -- --   01/08/19 0000 123/82 -- -- 67 69 -- 95 % -- --   01/07/19 2345 118/77 -- -- 67 71 -- 93 % -- --   01/07/19 2330 117/82 -- -- 74 72 -- 94 % -- --   01/07/19 2315 119/76 -- -- 72 73 -- 93 % -- --   01/07/19 2300 (!) 127/92 -- -- 74 75 -- 97 % -- --   01/07/19 2245 129/82 -- -- 79 80 -- 96 % -- --   01/07/19 2226 -- 98.2  F (36.8  C) Oral -- -- -- -- -- --   01/07/19 2225 (!) 150/103 -- -- 85 85 16 96 % 1.778 m (5' 10\") 95.3 kg (210 lb)       Physical Exam    Nursing note and vitals reviewed.    Constitutional: Pleasant and well groomed.          HENT:    Mouth/Throat: Oropharynx is without swelling or erythema. Oral mucosa moist.    Eyes: Conjunctivae are normal. No scleral icterus.    Neck: Neck supple.   Cardiovascular: Normal rate, regular rhythm and intact distal pulses. Minimal chest wall tenderness, no overlying skin changes.    Pulmonary/Chest: Diminished breath sounds throughout.  Abdominal: Soft.  No distension. RUQ tenderness and mild epigastric tenderness. No guarding.   Musculoskeletal:  No edema, No calf tenderness  Neurological:Alert. Coordination normal.   Skin: Skin is warm and dry. "   Psychiatric: Normal mood and affect.     Emergency Department Course   ECG:  Indication: Chest Pain  Time: 2222  Vent. Rate 84 bpm. NV interval 184. QRS duration 90. QT/QTc 352/415. P-R-T axis 41 7 11. Normal sinus rhythm. Nonspecific T wave abnormality. Abnormal ECG. Lateral T wave changes compared to EKG dated 4/21/16. Read time: 2307    Imaging:  CT Chest w/ IV contrast - PE protocol:  1. Acute pulmonary emboli in lobar, segmental, and subsegmental pulmonary arteries in the right middle and right lower lobes.  2. A small region of hazy opacities in the posterior aspect of the right lung base, suspicious for a small pulmonary infarct.  3. A trace amount of right pleural fluid.   As per radiology.     Laboratory:  2235 CBC: WBC: 12.2 (H), HGB: 15.8, PLT: 238  2257 ISTAT Basic Metabolic Panel: Glucose: 138 (H), o/w WNL  CMP: Glucose 136 (H), o/w WNL (Creatinine: 1.02)  ISTAT VBG with Lactate: PCO2: 33 (L), PO2: 51 (H), Lactic Acid: 1.8, o/w WNL  Lipase: 79  Troponin I: <0.015  D Dimer: 1.7 (H)  UA with micro: Protein Albumin: 30, Bacteria: Few, Mucous: Present o/w negative    Interventions:  2257 Albuterol-Ipratropium 2.5mg-0.5mg/3ml, inhalation solution, Nebulizer  0235 Xarelto 15 mg, PO  0235 Tylenol 1000 mg, PO    Emergency Department Course:  2240 Nursing notes and vitals reviewed. I performed an exam of the patient as documented above.     IV inserted. Medicine administered as documented above. Blood drawn. This was sent to the lab for further testing, results above.    The patient was sent for a CT, PE study, while in the emergency department, findings above.     EKG obtained in the ED, see results above.     2331 I rechecked the patient and discussed the results of his workup thus far. Patient declining pain medication until admission is certain as he drove himself to the ED and has no ride home.     0125 I consulted with Dr. Mills of radiology who informed me that the patient has a PE to the right middle  lobe and right lower lobe.     0200 I rechecked the patient and we discussed the risks and benefits of discharge versus admission. I calculated him to be a Class 1 PESI patient and at low risk. Plan for discharge home. The patient tolerated PO challenge without difficulty here in the ED.    Findings and plan explained to the patient. Patient discharged home with instructions regarding supportive care, medications, and reasons to return. The importance of close follow-up was reviewed.     I personally reviewed the laboratory results with the patient and answered all related questions prior to discharge.    Impression & Plan      Medical Decision Making:  Steve Corcoran is a 53 year old male who presents for evaluation of chest pain and a history of PE in the past.  The differential diagnosis included but was not limited to Pulmonary embolism, pneumothorax, pneumonia, pleurisy, pericarditis, acute coronary syndrome, biliary colic, and  musculoskeletal chest pain.    The workup in the Emergency Department includes the labs as outlined above. EKG was without acute ischemic changes. CT of the chest reveals a pulmonary embolism in RML and RLL.  The patient is hemodynamically stable and therefore thrombolytics are not indicated. I discussed the risk and benefits of anticoagulation, his PESI Score and possibility of outpatient management.  There are no contraindications but patient understands the risk/benefit ratio to this therapy and the possibility of serious and/or life-threatening hemorrhage. The patient prefers outpatient management. Xarelto was administered in the ED. He received standard narcotic precautions and understands to return to the ED with any new or worsening symptoms. Otherwise he will arrange follow up in 2 days with PMD. He understands that he will likely ongoing anticoagulation but that I have only provided him with the first 3 weeks. After this time the dose changes which will be provided by his PMD. He  will hold is aspirin and will avoid NSAID.       Diagnosis:     ICD-10-CM    1. Other acute pulmonary embolism without acute cor pulmonale (H) I26.99        Plan:   Admit to telemetry.           Diagnosis:    ICD-10-CM    1. Other acute pulmonary embolism without acute cor pulmonale (H) I26.99        Disposition:  discharged to home    Discharge Medications:     Medication List      Started    acetaminophen 500 MG tablet  Commonly known as:  TYLENOL  500-1,000 mg, Oral, EVERY 8 HOURS PRN, DO NOT FILL!  For Dosing Only     oxyCODONE 5 MG tablet  Commonly known as:  ROXICODONE  5 mg, Oral, EVERY 6 HOURS PRN     rivaroxaban ANTICOAGULANT 15 MG Tabs tablet  Commonly known as:  XARELTO  15 mg, Oral, 2 TIMES DAILY WITH MEALS          Scribe Disclosure:  I, Killian Be, am serving as a scribe on 1/7/2019 at 10:40 PM to personally document services performed by Betzy Andres MD based on my observations and the provider's statements to me.     Killian Be  1/7/2019   [facility] EMERGENCY DEPARTMENT       Betzy Andres MD  01/08/19 0326

## 2019-01-08 NOTE — ED NOTES
Return from CT, no change in pain, but states he became very short of breath after movement for CT

## 2019-01-08 NOTE — DISCHARGE INSTRUCTIONS
Do not take aspirin or NSAID (such as ibuprofen) while on this medication.   Opioid Medication Discharge Instructions    You have been given a prescription for an opioid (narcotic) pain medicine and/or have   received a pain medicine while here in the emergency department. These medicines can make you drowsy or impaired.     You must not drive, operate dangerous equipment, or   engage in any other dangerous activities while taking these medications. If you drive while taking these medications, you could be arrested for DUI, or driving under the   influence. Do not drink any alcohol while you are taking these medications.     Opioid pain medications can cause addiction. If you have a history of chemical   dependency of any type, you are at a higher risk of becoming addicted to pain   medications. Only take these prescribed medications to treat your pain when all other   options have been tried. Take it for as short a time and as few doses as possible.     Store your pain pills in a secure place, as they are frequently stolen and provide a dangerous opportunity for children or visitors in your house to start abusing these powerful medications. We will not replace any lost or stolen medicine.     As soon as your pain is better, you should safely dispose of all your remaining medication.     Many prescription pain medications contain Tylenol (acetaminophen), including Vicodin, Tylenol #3, Norco, Lortab, and Percocet. You should not take any extra pills of Tylenol if you are using these prescription medications or you can get very sick. Do not ever take more than 4000 mg of acetaminophen in any 24 hour period.    All opioids tend to cause constipation. Drink plenty of water and eat foods that have   a lot of fiber, such as fruits, vegetables, prune juice, apple juice and high fiber cereal.   Take a laxative if you don?t move your bowels at least every other day. Miralax, Milk of   Magnesia, Colace, or Senna can be used  to keep you regular.

## 2019-01-16 ENCOUNTER — OFFICE VISIT (OUTPATIENT)
Dept: PEDIATRICS | Facility: CLINIC | Age: 54
End: 2019-01-16
Payer: COMMERCIAL

## 2019-01-16 VITALS
HEART RATE: 85 BPM | HEIGHT: 70 IN | OXYGEN SATURATION: 97 % | SYSTOLIC BLOOD PRESSURE: 112 MMHG | WEIGHT: 200 LBS | TEMPERATURE: 99.3 F | DIASTOLIC BLOOD PRESSURE: 84 MMHG | BODY MASS INDEX: 28.63 KG/M2

## 2019-01-16 DIAGNOSIS — D68.9 COAGULATION DISORDER (H): ICD-10-CM

## 2019-01-16 DIAGNOSIS — I26.99 OTHER ACUTE PULMONARY EMBOLISM WITHOUT ACUTE COR PULMONALE (H): Primary | ICD-10-CM

## 2019-01-16 PROCEDURE — 99214 OFFICE O/P EST MOD 30 MIN: CPT | Mod: GC | Performed by: STUDENT IN AN ORGANIZED HEALTH CARE EDUCATION/TRAINING PROGRAM

## 2019-01-16 ASSESSMENT — MIFFLIN-ST. JEOR: SCORE: 1763.44

## 2019-01-16 NOTE — PATIENT INSTRUCTIONS
1. Call hematology to set up an appointment to discuss duration of anti-coagulation, which may be lifelong.  2. Return to clinic after your hematology visit for general check up with Dr. Pam Rubio on a Wednesday to discuss your other medications and smoking cessation.  3.  Xarelto from [facility]. When your 3 week twice daily prescription is complete, start taking 20mg once daily.    Discharge Instructions for Pulmonary Embolism  A deep vein thrombosis (DVT) is a blood clot in a large vein deep in a leg, arm, or elsewhere in the body. The clot can separate from the vein, travel to the lungs and cut off blood flow. This is a pulmonary embolism (PE). Pulmonary embolism is very serious and may cause death.   Healthcare providers use the term venous thromboembolism (VTE) to describe both DVT and PE. They use the term VTE because the two conditions are very closely related. And, because their prevention and treatment are closely related.   Home care  Taking care of yourself is very important. To help prevent more blood clots from forming, follow your healthcare provider's instructions. Do the following:    Take your medicines exactly as instructed. Don t skip doses. If you miss a dose, call your healthcare provider and ask what you should do.      Have all lab tests as recommended. This is very important when you take medicines to prevent blood clots.     If your healthcare provider has instructed you to do so, wear elastic (compression stockings).    Get up and get moving.    While sitting for long periods of time, move your knees, ankles, feet, and toes.  Lifestyle changes  To help prevent problems with your heart and blood vessels, do the following:     If you smoke, get help to quit. Talk with your healthcare provider about medicines and programs that can help.    Stay at a healthy weight. Talk to your healthcare provider about losing weight, if you are overweight    Try to exercise at least 30 minutes on  most days. Before starting an exercise program, talk with your healthcare provider.     When traveling by car, make frequent stops to get up and move around.    On long airplane rides, get up and move around when possible. If you can t get up, wiggle your toes, move your ankles and tighten your calves to keep your blood moving.  Follow-up care  Make a follow-up appointment as directed. Have your lab work done as directed.     When to call your healthcare provider  Call your healthcare provider right away if you have:    Pain, swelling, and redness in your leg, arm, or other body area. These symptoms may mean another blood clot.    Blood in your urine    Bleeding with bowel movements    Bleeding from the nose, gums, a cut, or vagina  Call 911  Call 911 if you have symptoms of a blood clot in the lungs:     Chest pain    Trouble breathing    Coughing (may cough up blood)    Fast heartbeat    Sweating    Fainting  Also call 911 if you have:    Heavy or uncontrolled bleeding. If you are taking a blood thinner, you have an increased chance of bleeding.   Date Last Reviewed: 2/1/2017 2000-2018 [employer]. [address] [place] [id number]. All rights reserved. This information is not intended as a substitute for professional medical care. Always follow your healthcare professional's instructions.

## 2019-01-16 NOTE — LETTER
January 16, 2019      Steve Corcoran  [address]  [facility] [address]        To Whom It May Concern:    Steve Corcoran was seen in our clinic. He may return to work with no restrictions with work load as tolerated. Encourage protective wear to reduce risk of injuries and bleeds.      Sincerely,        Pam Rubio MD

## 2019-01-16 NOTE — PROGRESS NOTES
SUBJECTIVE:   tSeve Corcoran is a 53 year old male who presents to clinic today for the following health issues:    ED/UC Followup:    Facility:  [facility]   Date of visit: 01/07/19   Reason for visit: Other acute pulmonary embolism without acute cor pulmonale (H)     Current Status: Improving right sided chest pain     53 year old male with history of previous PE who presented to [facility] ED and diagnosed with right sided pulmonary embolism. He was started on Xarelto and sent home; was not admitted to the hospital. Had been on warfarin previously for previous PE. Reported history of Factor V Leiden deficiency and iliac stent, coronary artery disease, hypertension, dyslipidemia.  Works as heavy  and . Often gets bruised and minor scrapes. Pain and difficulty breathing getting better. Can sleep which is improved from when he first presented to ED. Still some pleuritic pain, right sided.    Other health maintenance:  - Has never had colonoscopy  - Previously on statin, aspirin, clopidogrel, anti-hypertensives  - Now just on Xarelto, Tylenol  - Recent separation from wife  - Current tobacco user; has cut down on cigarettes but still smokes daily     -Does not feel nicotine withdrawal symptoms but is associated with habits especially at work      Problem list and histories reviewed & adjusted, as indicated.  Additional history: as documented    Patient Active Problem List   Diagnosis     NSTEMI (non-ST elevated myocardial infarction) (H)     ACS (acute coronary syndrome) (H)     Dyslipidemia     CAD (coronary artery disease)     Hypertension     Coagulation disorder (H)     Tobacco abuse     Past Surgical History:   Procedure Laterality Date     ARTHROSCOPY KNEE  2/11/2013    Procedure: ARTHROSCOPY KNEE;  Diagnostic Right Knee Arthroscopy with medial meniscal debridement;  Surgeon: William Carter MD;  Location: [facility] OR     CORONARY ANGIOGRAPHY ADULT ORDER       ORTHOPEDIC SURGERY    "   right foot surg     ORTHOPEDIC SURGERY      left foot surg     ORTHOPEDIC SURGERY      thumb reattachment       Social History     Tobacco Use     Smoking status: Current Every Day Smoker     Packs/day: 0.75     Smokeless tobacco: Never Used   Substance Use Topics     Alcohol use: Yes     Comment: very occas     Family History   Problem Relation Age of Onset     Hypertension Mother      Unknown/Adopted No family hx of            Reviewed and updated as needed this visit by clinical staff  Tobacco  Allergies  Meds  Med Hx  Surg Hx  Fam Hx  Soc Hx      Reviewed and updated as needed this visit by Provider       ROS:  Constitutional, HEENT, cardiovascular, pulmonary, gi and gu systems are negative, except as otherwise noted.    OBJECTIVE:     /84 (BP Location: Right arm, Patient Position: Right side, Cuff Size: Adult Large)   Pulse 85   Temp 99.3  F (37.4  C) (Tympanic)   Ht 1.778 m (5' 10\")   Wt 90.7 kg (200 lb)   SpO2 97%   BMI 28.70 kg/m    Body mass index is 28.7 kg/m .  GENERAL: healthy, alert and no distress  EYES: Eyes grossly normal to inspection, PERRL and conjunctivae and sclerae normal  NECK: no adenopathy, no asymmetry, masses, or scars and thyroid normal to palpation  RESP: lungs clear to auscultation - no rales, rhonchi or wheezes  CV: regular rate and rhythm, normal S1 S2, no S3 or S4, no murmur, click or rub, no peripheral edema and peripheral pulses strong  ABDOMEN: soft, nontender, no hepatosplenomegaly, no masses and bowel sounds normal  MS: no gross musculoskeletal defects noted, no edema  SKIN: no suspicious lesions or rashes  NEURO: Normal strength and tone, mentation intact and speech normal    Diagnostic Test Results:  none     ASSESSMENT/PLAN:     Tobacco Cessation:   reports that he has been smoking.  He has been smoking about 0.75 packs per day. he has never used smokeless tobacco.  Tobacco Cessation Action Plan: Information offered: Patient not interested at this time "   Will follow up for general wellness exam after seen by hem/onc with plan to address smoking cessation, preventive health (colonoscopy), lipids, diabetes screening, immunizations, and blood pressure monitoring. May need follow up with cardiology or vascular surgery given history of questionable CAD and peripheral vascular disease.    1. History of pulmonary embolism  Will complete 3 week course of Xarelto 15mg BID, then transition to 20mg daily. Recommend follow up with hem regarding duration of therapy and recs for ongoing monitoring, though suspect as this is now his second PE and has history of Factor V Leiden requires llifelong anticoagulation. Given the nature of his job he is at high risk for superficial bleeds. He is not interested in warfarin.  - rivaroxaban ANTICOAGULANT (XARELTO) 20 MG TABS tablet; Take 1 tablet (20 mg) by mouth daily (with dinner)  Dispense: 90 tablet; Refill: 3  - ONC/HEME ADULT REFERRAL    Follow up with Dr. Ruboi after seen by hematology to discuss other medical concerns    Pam Rubio MD  [facility]    I have seen the patient, discussed with the resident and agree with the history, physical and plan as documented above.    Deborah Wolff MD  Internal Medicine - Pediatrics

## 2019-01-17 ENCOUNTER — TELEPHONE (OUTPATIENT)
Dept: ONCOLOGY | Facility: CLINIC | Age: 54
End: 2019-01-17

## 2019-01-17 NOTE — TELEPHONE ENCOUNTER
ONCOLOGY INTAKE: Records Information      APPT INFORMATION:  Referring provider:  Deborah Wolff   Referring provider s clinic:  [facility]  Reason for visit/diagnosis:  Pulmonary Embolism    Were the records received with the referral (via [employer])? No - Internal Referral    Has patient been seen for any external appt for this diagnosis (enter clinic/location)? Per PT, no outside records

## 2019-02-01 ENCOUNTER — ONCOLOGY VISIT (OUTPATIENT)
Dept: ONCOLOGY | Facility: CLINIC | Age: 54
End: 2019-02-01
Attending: INTERNAL MEDICINE
Payer: COMMERCIAL

## 2019-02-01 VITALS
OXYGEN SATURATION: 99 % | HEIGHT: 70 IN | HEART RATE: 62 BPM | SYSTOLIC BLOOD PRESSURE: 127 MMHG | BODY MASS INDEX: 29.09 KG/M2 | DIASTOLIC BLOOD PRESSURE: 88 MMHG | TEMPERATURE: 97.6 F | WEIGHT: 203.2 LBS | RESPIRATION RATE: 16 BRPM

## 2019-02-01 DIAGNOSIS — I26.99 OTHER ACUTE PULMONARY EMBOLISM WITHOUT ACUTE COR PULMONALE (H): Primary | ICD-10-CM

## 2019-02-01 DIAGNOSIS — D68.51 FACTOR V LEIDEN MUTATION (H): ICD-10-CM

## 2019-02-01 DIAGNOSIS — I26.99 PULMONARY INFARCTION (H): ICD-10-CM

## 2019-02-01 PROCEDURE — 99205 OFFICE O/P NEW HI 60 MIN: CPT | Performed by: INTERNAL MEDICINE

## 2019-02-01 PROCEDURE — G0463 HOSPITAL OUTPT CLINIC VISIT: HCPCS

## 2019-02-01 ASSESSMENT — PAIN SCALES - GENERAL: PAINLEVEL: NO PAIN (0)

## 2019-02-01 ASSESSMENT — MIFFLIN-ST. JEOR: SCORE: 1777.96

## 2019-02-01 NOTE — NURSING NOTE
"Oncology Rooming Note    February 1, 2019 2:52 PM   Steve Corcoran is a 53 year old male who presents for:    Chief Complaint   Patient presents with     Oncology Clinic Visit     New Patient-Consult     Initial Vitals: /88   Pulse 62   Temp 97.6  F (36.4  C) (Tympanic)   Resp 16   Ht 1.778 m (5' 10\")   Wt 92.2 kg (203 lb 3.2 oz)   SpO2 99%   BMI 29.16 kg/m   Estimated body mass index is 29.16 kg/m  as calculated from the following:    Height as of this encounter: 1.778 m (5' 10\").    Weight as of this encounter: 92.2 kg (203 lb 3.2 oz). Body surface area is 2.13 meters squared.  No Pain (0) Comment: Data Unavailable   No LMP for male patient.  Allergies reviewed: Yes  Medications reviewed: Yes    Medications: Medication refills not needed today.  Pharmacy name entered into [employer]:    [id number] [address] [id number] [place], [place] - [address] [facility]    Clinical concerns: New Patient    8 minutes for nursing intake (face to face time)     Nayely Kauffman CMA            DISCHARGE PLAN:  Next appointments: See patient instruction section  Departure Mode: Ambulatory  Accompanied by: self  0 minutes for nursing discharge (face to face time)   Nayely Kauffman CMA      "

## 2019-02-01 NOTE — LETTER
2/1/2019         RE: Steve Corcoran  [address]  [facility] [address]        Dear Colleague,    Thank you for referring your patient, Steve Corcoran, to the [facility]. Please see a copy of my visit note below.    [facility]    NEW PATIENT VISIT NOTE    PATIENT NAME: Steve Corcoran MRN # [id number]  DATE OF VISIT: February 1, 2019 YOB: 1965    REFERRING PROVIDER: Pam Rubio MD  [address] [address]       HISTORY OF PRESENT ILLNESS     Steve is here alone at this visit. History is presented by him and per charts. He notes that he was at work in Sep 2011. He had acute onset CP and SOB while he was working underneath a truck. He had been active on his feet 5 days a week prior to that. He went to an urgent care where he was noted to be tachycardic and SOB. He went to his PCP and collapsed in the office. He was taken by EMS to [facility] and was admitted on 9/27/11 for acute pulmonary embolism. Work up during this admission revealed factor V Leiden mutation. He was started on coumadin which was continued for a year. He did not wish to continue due to risk of trauma in his job.     He has had NSTEMI in 2016 (was admitted on 2/29/16 through 3/1/18) when he presented with chest pain radiating to his neck and SOB. He treated with heparin, beta blockers and ASA Cardiology was consulted and angiogram done 03/01/2016. He was noted to have diffuse atherosclerotic changes, but no significant focal narrowing that would warrant PCI. Patient was recommended to take recommended medications and follow life style modifications including quitting smoking.     He was started on ASA 81 mg daily, plavix 75 mg daily, atorvastatin 40 mg daily, lisinopril 10 mg daily and metoprolol 25 mg twice daily. Recently in Aug of 2018 his wife had a discussion with his primary care physician and she understood  that he had been asked to discontinue all of his medications. He was off all his medications until last month when he presented to ED on 1/7/19 with persistent SOB and CP. He did have mild elevation in his D-dimer at 1.7 and his CT chest revealed acute pulmonary emboli in lobar, segmental and subsegmental arteries in RML and RLL with a small area in posterior right lung base consistent with infarct. He was discharged home on rivaroxaban 15 mg po bid. He has been taking this for 3 weeks. He has followed up with his PCP as an outpatient and was referred to hematology for his thrombophilia.        PAST MEDICAL HISTORY   -  Recurrent unprovoked pulmonary embolism  - CAD with NSTEMI (atherosclerotic coronary artery disease)      CURRENT OUTPATIENT MEDICATIONS     Current Outpatient Medications   Medication Sig     rivaroxaban ANTICOAGULANT (XARELTO) 20 MG TABS tablet Take 1 tablet (20 mg) by mouth daily (with dinner)     rivaroxaban ANTICOAGULANT (XARELTO) 15 MG TABS tablet Take 1 tablet (15 mg) by mouth 2 times daily (with meals) for 21 days     No current facility-administered medications for this visit.         ALLERGIES    No Known Allergies     SOCIAL HISTORY   He is in process of getting a divorce. He lives alone.     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   He smokes about a half pack daily down from pack a day for about 35 yrs.   He was on chantix for a while which was not very helpful.     He drinks alcohol on weekends (whiskey) , nothing on week days. He denies recreational drug use.      FAMILY HISTORY   - HTN in both maternal grandparents, mother and 2 of her brothers have HTN.   - Prostate cancer - maternal. He has 4 aunts, 3 uncles and mother     REVIEW OF SYSTEMS   As above in the HPI, o/w complete 12-point ROS was negative.     PHYSICAL EXAM   B/P: 127/88, T: 97.6, P: 62, R: 16  @LASTSAO2(4)@  Wt Readings from Last 3 Encounters:    02/01/19 92.2 kg (203 lb 3.2 oz)   01/16/19 90.7 kg (200 lb)   01/07/19 95.3 kg (210 lb)     GEN: NAD  HEENT: PERRL, EOMI, no icterus, injection or pallor. Oropharynx is clear.  NECK: no cervical or supraclavicular lymphadenopathy  LUNGS: clear bilaterally  CV: regular, no murmurs, rubs, or gallops  ABDOMEN: soft, non-tender, non-distended, normal bowel sounds, no hepatosplenomegaly by percussion or palpation  EXT: warm, well perfused, no edema  NEURO: alert  SKIN: no rashes     LABORATORY AND IMAGING STUDIES     Recent Labs   Lab Test 01/07/19 2257 01/07/19 2235 04/21/16  1325 03/01/16  0620 02/29/16  1450    136 134 140 139   POTASSIUM 3.6 3.7 3.7 4.0 3.6   CHLORIDE 100 104 104 108 104   CO2  --  24 26 26 27   ANIONGAP 16 8 4 6 8   BUN 7 8 12 11 11   CR  --  1.02 1.02 0.95 1.06   * 136* 93 91 104*   JEANINE  --  8.9 8.6 8.2* 8.8     No results for input(s): MAG, PHOS in the last 42288 hours.  Recent Labs   Lab Test 01/07/19 2257 01/07/19 2235 04/21/16  1325 02/29/16  2200 02/29/16  1843 02/29/16  1450   WBC  --  12.2* 7.3 8.1 10.0 8.8   HGB 15.6 15.8 14.0 15.1 16.2 16.2   PLT  --  238 242 244 268 276   MCV  --  90 86 88 87 87   NEUTROPHIL  --  69.9 51.5  --   --  55.8     Recent Labs   Lab Test 01/07/19 2235 05/02/16  0837   BILITOTAL 1.1  --    ALKPHOS 86  --    ALT 20 44   AST 11  --    ALBUMIN 3.9  --        Results for orders placed or performed during the hospital encounter of 01/07/19   CT Chest Pulmonary Embolism w Contrast     Value    Radiologist flags Pulmonary embolism (AA)    Narrative    CT CHEST WITH CONTRAST  1/8/2019 12:34 AM     HISTORY: Chest pain and shortness of breath. Positive D-dimer.    COMPARISON: 2/29/2016.    TECHNIQUE: Following the uneventful administration of 75 mL Isovue-370  intravenous contrast, helical sections were acquired through the lungs  according to the pulmonary embolism protocol. Coronal reconstructions  were generated. Radiation dose for this scan was  reduced using  automated exposure control, adjustment of the mA and/or kV according  to the patient's size, or iterative reconstruction technique.    FINDINGS: Filling defects within the right middle and lower lobe  pulmonary arteries in addition to a few segmental and subsegmental  pulmonary arteries in the right middle lobe and right lower lobe,  consistent with acute pulmonary emboli. The thoracic aorta is normal  in caliber without dissection.    A small region of hazy opacities in the posterior aspect of the right  lung base. A few tiny nodules scattered within the lungs are unchanged  since 2/29/2016 and therefore consistent with benign etiology. A trace  amount of right pleural fluid. No left pleural or pericardial  effusion. No enlarged lymph nodes in the chest.    Scan through the upper abdomen is significant for a tiny gallstone in  the gallbladder.      Impression    IMPRESSION:   1. Acute pulmonary emboli in lobar, segmental, and subsegmental  pulmonary arteries in the right middle and right lower lobes.  2. A small region of hazy opacities in the posterior aspect of the  right lung base, suspicious for a small pulmonary infarct.  3. A trace amount of right pleural fluid.    [Critical Result: Pulmonary embolism]    Finding was identified on 1/8/2019 1:18 AM.     Dr. Andres was contacted by me on 1/8/2019 1:25 AM and verbalized  understanding of the critical result.    MEETA OTT MD         ASSESSMENT    1. Recurrent symptomatic unprovoked pulmonary embolism (initial in 2011 and current in Jan 2019)  2. CAD with NSTEMI (atherosclerotic coronary artery disease)  3. ECOG PS 0    DISCUSSION   I had a lengthy discussion with patient who is alone at this clinic visit. I tried to explain thrombophilia with risk factors, precipitating factors, risk, prognosis and management.     I explained risk factors and other etiologies including immobility, inflammation, infections/sepsis, thrombophilia with inherited  disorders.     By a rule of thumb anti-coagulation is recommended for period 3-6 months after the provoking factor is gone. If there is no known provoking factor or the risk is persistent (lifelong), then the recommended duration of anticoagulation is life long. This has to be balanced against the risk of bleeding, the burden of anticoagulation and possibly the risk of clotting based on initial thrombosis (small DVT vs massive PE).     He had recurrent PE that has been symptomatic and provoked on both occasions. He has survived both of these episodes, but it is quite possible that he could have another episode in the absence of anticoagulation. He had thrombophilia work up done in 2011 where he was identified to have factor V Leiden mutation. I do believe that barring any strong contraindications, I would recommend long term anticoagulation due to clearly increased risk of developing a life threatening clot. With advancing age, risk for clot would continue to steadily increase.     Anticoagulants like coumadin do not dissolve clots. In fact it merely prevents further extension of the clot. Bodies own thrombolytic system does attempt to dissolve the clot. Since lower extremities have low flow system unlike the lungs, the clots often persist. Thrombolytics are available and can resolve clots but they carry a huge risk of bleeding. They are used in the setting of saddle thrombus or other hemodynamically significant thrombus. These agents are more commonly used in the setting of arterial thrombosis as in acute MI, acute CVA or gangrene as the benefits of revascularization outweigh the risks of bleeding.       I reviewed newer oral agents referred as direct anticoagulants - direct factor Xa and thrombin inhibitors which require less monitoring, have fewer drug interactions and possibly could be more effective. He is not interested in coumadin and has started on rivaroxaban.     I reviewed that I would always be available  as needed when the anticoagulation would have to be held for procedure. For most procedures holding for 2 days prior to the procedure would be adequate (such that his last dose would be 3 days prior to planned surgery/procedure. Anticoagulation can be started a day after procedure when adequate hemostasis has been achieved and there is no active bleeding.        PLAN   Continue with rivaroxaban 20 mg po daily  Reviewed holding therapy for surgical procedures.  I will see him in a year with CBC prior to clinic visit.     Over 60 min of direct face to face time spent with patient with more than 50% time spent in counseling and coordinating care.      Bob Tomlinson  Adj ,  Division of Hematology, Oncology & Transplantation  [facility].       Again, thank you for allowing me to participate in the care of your patient.        Sincerely,        Bob Tomlinson MD

## 2019-02-01 NOTE — PROGRESS NOTES
[facility] CANCER CLINIC    NEW PATIENT VISIT NOTE    PATIENT NAME: Steve Corcoran MRN # [id number]  DATE OF VISIT: February 1, 2019 YOB: 1965    REFERRING PROVIDER: Pam Rubio MD  [address] [address]       HISTORY OF PRESENT ILLNESS     Steve is here alone at this visit. History is presented by him and per charts. He notes that he was at work in Sep 2011. He had acute onset CP and SOB while he was working underneath a truck. He had been active on his feet 5 days a week prior to that. He went to an urgent care where he was noted to be tachycardic and SOB. He went to his PCP and collapsed in the office. He was taken by EMS to [facility] and was admitted on 9/27/11 for acute pulmonary embolism. Work up during this admission revealed factor V Leiden mutation. He was started on coumadin which was continued for a year. He did not wish to continue due to risk of trauma in his job.     He has had NSTEMI in 2016 (was admitted on 2/29/16 through 3/1/18) when he presented with chest pain radiating to his neck and SOB. He treated with heparin, beta blockers and ASA Cardiology was consulted and angiogram done 03/01/2016. He was noted to have diffuse atherosclerotic changes, but no significant focal narrowing that would warrant PCI. Patient was recommended to take recommended medications and follow life style modifications including quitting smoking.     He was started on ASA 81 mg daily, plavix 75 mg daily, atorvastatin 40 mg daily, lisinopril 10 mg daily and metoprolol 25 mg twice daily. Recently in Aug of 2018 his wife had a discussion with his primary care physician and she understood that he had been asked to discontinue all of his medications. He was off all his medications until last month when he presented to ED on 1/7/19 with persistent SOB and CP. He did have mild elevation in his D-dimer at 1.7 and his CT chest revealed acute  pulmonary emboli in lobar, segmental and subsegmental arteries in RML and RLL with a small area in posterior right lung base consistent with infarct. He was discharged home on rivaroxaban 15 mg po bid. He has been taking this for 3 weeks. He has followed up with his PCP as an outpatient and was referred to hematology for his thrombophilia.        PAST MEDICAL HISTORY   -  Recurrent unprovoked pulmonary embolism  - CAD with NSTEMI (atherosclerotic coronary artery disease)      CURRENT OUTPATIENT MEDICATIONS     Current Outpatient Medications   Medication Sig     rivaroxaban ANTICOAGULANT (XARELTO) 20 MG TABS tablet Take 1 tablet (20 mg) by mouth daily (with dinner)     rivaroxaban ANTICOAGULANT (XARELTO) 15 MG TABS tablet Take 1 tablet (15 mg) by mouth 2 times daily (with meals) for 21 days     No current facility-administered medications for this visit.         ALLERGIES    No Known Allergies     SOCIAL HISTORY   He is in process of getting a divorce. He lives alone.     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   He smokes about a half pack daily down from pack a day for about 35 yrs.   He was on chantix for a while which was not very helpful.     He drinks alcohol on weekends (whiskey) , nothing on week days. He denies recreational drug use.      FAMILY HISTORY   - HTN in both maternal grandparents, mother and 2 of her brothers have HTN.   - Prostate cancer - maternal. He has 4 aunts, 3 uncles and mother     REVIEW OF SYSTEMS   As above in the HPI, o/w complete 12-point ROS was negative.     PHYSICAL EXAM   B/P: 127/88, T: 97.6, P: 62, R: 16  @LASTSAO2(4)@  Wt Readings from Last 3 Encounters:   02/01/19 92.2 kg (203 lb 3.2 oz)   01/16/19 90.7 kg (200 lb)   01/07/19 95.3 kg (210 lb)     GEN: NAD  HEENT: PERRL, EOMI, no icterus, injection or pallor. Oropharynx is clear.  NECK: no cervical or supraclavicular lymphadenopathy  LUNGS: clear  bilaterally  CV: regular, no murmurs, rubs, or gallops  ABDOMEN: soft, non-tender, non-distended, normal bowel sounds, no hepatosplenomegaly by percussion or palpation  EXT: warm, well perfused, no edema  NEURO: alert  SKIN: no rashes     LABORATORY AND IMAGING STUDIES     Recent Labs   Lab Test 01/07/19 2257 01/07/19 2235 04/21/16  1325 03/01/16  0620 02/29/16  1450    136 134 140 139   POTASSIUM 3.6 3.7 3.7 4.0 3.6   CHLORIDE 100 104 104 108 104   CO2  --  24 26 26 27   ANIONGAP 16 8 4 6 8   BUN 7 8 12 11 11   CR  --  1.02 1.02 0.95 1.06   * 136* 93 91 104*   JEANINE  --  8.9 8.6 8.2* 8.8     No results for input(s): MAG, PHOS in the last 74872 hours.  Recent Labs   Lab Test 01/07/19 2257 01/07/19 2235 04/21/16  1325 02/29/16  2200 02/29/16  1843 02/29/16  1450   WBC  --  12.2* 7.3 8.1 10.0 8.8   HGB 15.6 15.8 14.0 15.1 16.2 16.2   PLT  --  238 242 244 268 276   MCV  --  90 86 88 87 87   NEUTROPHIL  --  69.9 51.5  --   --  55.8     Recent Labs   Lab Test 01/07/19 2235 05/02/16  0837   BILITOTAL 1.1  --    ALKPHOS 86  --    ALT 20 44   AST 11  --    ALBUMIN 3.9  --        Results for orders placed or performed during the hospital encounter of 01/07/19   CT Chest Pulmonary Embolism w Contrast     Value    Radiologist flags Pulmonary embolism (AA)    Narrative    CT CHEST WITH CONTRAST  1/8/2019 12:34 AM     HISTORY: Chest pain and shortness of breath. Positive D-dimer.    COMPARISON: 2/29/2016.    TECHNIQUE: Following the uneventful administration of 75 mL Isovue-370  intravenous contrast, helical sections were acquired through the lungs  according to the pulmonary embolism protocol. Coronal reconstructions  were generated. Radiation dose for this scan was reduced using  automated exposure control, adjustment of the mA and/or kV according  to the patient's size, or iterative reconstruction technique.    FINDINGS: Filling defects within the right middle and lower lobe  pulmonary arteries in addition to  a few segmental and subsegmental  pulmonary arteries in the right middle lobe and right lower lobe,  consistent with acute pulmonary emboli. The thoracic aorta is normal  in caliber without dissection.    A small region of hazy opacities in the posterior aspect of the right  lung base. A few tiny nodules scattered within the lungs are unchanged  since 2/29/2016 and therefore consistent with benign etiology. A trace  amount of right pleural fluid. No left pleural or pericardial  effusion. No enlarged lymph nodes in the chest.    Scan through the upper abdomen is significant for a tiny gallstone in  the gallbladder.      Impression    IMPRESSION:   1. Acute pulmonary emboli in lobar, segmental, and subsegmental  pulmonary arteries in the right middle and right lower lobes.  2. A small region of hazy opacities in the posterior aspect of the  right lung base, suspicious for a small pulmonary infarct.  3. A trace amount of right pleural fluid.    [Critical Result: Pulmonary embolism]    Finding was identified on 1/8/2019 1:18 AM.     Dr. Andres was contacted by me on 1/8/2019 1:25 AM and verbalized  understanding of the critical result.    MEETA OTT MD         ASSESSMENT    1. Recurrent symptomatic unprovoked pulmonary embolism (initial in 2011 and current in Jan 2019)  2. CAD with NSTEMI (atherosclerotic coronary artery disease)  3. ECOG PS 0    DISCUSSION   I had a lengthy discussion with patient who is alone at this clinic visit. I tried to explain thrombophilia with risk factors, precipitating factors, risk, prognosis and management.     I explained risk factors and other etiologies including immobility, inflammation, infections/sepsis, thrombophilia with inherited disorders.     By a rule of thumb anti-coagulation is recommended for period 3-6 months after the provoking factor is gone. If there is no known provoking factor or the risk is persistent (lifelong), then the recommended duration of anticoagulation  is life long. This has to be balanced against the risk of bleeding, the burden of anticoagulation and possibly the risk of clotting based on initial thrombosis (small DVT vs massive PE).     He had recurrent PE that has been symptomatic and provoked on both occasions. He has survived both of these episodes, but it is quite possible that he could have another episode in the absence of anticoagulation. He had thrombophilia work up done in 2011 where he was identified to have factor V Leiden mutation. I do believe that barring any strong contraindications, I would recommend long term anticoagulation due to clearly increased risk of developing a life threatening clot. With advancing age, risk for clot would continue to steadily increase.     Anticoagulants like coumadin do not dissolve clots. In fact it merely prevents further extension of the clot. Bodies own thrombolytic system does attempt to dissolve the clot. Since lower extremities have low flow system unlike the lungs, the clots often persist. Thrombolytics are available and can resolve clots but they carry a huge risk of bleeding. They are used in the setting of saddle thrombus or other hemodynamically significant thrombus. These agents are more commonly used in the setting of arterial thrombosis as in acute MI, acute CVA or gangrene as the benefits of revascularization outweigh the risks of bleeding.       I reviewed newer oral agents referred as direct anticoagulants - direct factor Xa and thrombin inhibitors which require less monitoring, have fewer drug interactions and possibly could be more effective. He is not interested in coumadin and has started on rivaroxaban.     I reviewed that I would always be available as needed when the anticoagulation would have to be held for procedure. For most procedures holding for 2 days prior to the procedure would be adequate (such that his last dose would be 3 days prior to planned surgery/procedure. Anticoagulation can  be started a day after procedure when adequate hemostasis has been achieved and there is no active bleeding.        PLAN   Continue with rivaroxaban 20 mg po daily  Reviewed holding therapy for surgical procedures.  I will see him in a year with CBC prior to clinic visit.     Over 60 min of direct face to face time spent with patient with more than 50% time spent in counseling and coordinating care.      Bob Leblanc ,  Division of Hematology, Oncology & Transplantation  [facility].

## 2020-01-22 DIAGNOSIS — I26.99 OTHER ACUTE PULMONARY EMBOLISM WITHOUT ACUTE COR PULMONALE (H): Primary | ICD-10-CM

## 2021-03-22 ENCOUNTER — RECORDS - HEALTHEAST (OUTPATIENT)
Dept: LAB | Facility: CLINIC | Age: 56
End: 2021-03-22

## 2021-03-22 LAB
ANION GAP SERPL CALCULATED.3IONS-SCNC: 11 MMOL/L (ref 5–18)
BUN SERPL-MCNC: 12 MG/DL (ref 8–22)
CALCIUM SERPL-MCNC: 9.3 MG/DL (ref 8.5–10.5)
CHLORIDE BLD-SCNC: 104 MMOL/L (ref 98–107)
CHOLEST SERPL-MCNC: 222 MG/DL
CO2 SERPL-SCNC: 23 MMOL/L (ref 22–31)
CREAT SERPL-MCNC: 1.11 MG/DL (ref 0.7–1.3)
FASTING STATUS PATIENT QL REPORTED: ABNORMAL
GFR SERPL CREATININE-BSD FRML MDRD: >60 ML/MIN/1.73M2
GLUCOSE BLD-MCNC: 119 MG/DL (ref 70–125)
HDLC SERPL-MCNC: 40 MG/DL
LDLC SERPL CALC-MCNC: 137 MG/DL
POTASSIUM BLD-SCNC: 3.8 MMOL/L (ref 3.5–5)
SODIUM SERPL-SCNC: 138 MMOL/L (ref 136–145)
TRIGL SERPL-MCNC: 224 MG/DL

## 2021-06-03 ENCOUNTER — RECORDS - HEALTHEAST (OUTPATIENT)
Dept: ADMINISTRATIVE | Facility: CLINIC | Age: 56
End: 2021-06-03

## (undated) RX ORDER — GINSENG 100 MG
CAPSULE ORAL
Status: DISPENSED
Start: 2018-01-15